# Patient Record
Sex: MALE | Race: WHITE | Employment: FULL TIME | URBAN - METROPOLITAN AREA
[De-identification: names, ages, dates, MRNs, and addresses within clinical notes are randomized per-mention and may not be internally consistent; named-entity substitution may affect disease eponyms.]

---

## 2017-10-02 DIAGNOSIS — E78.00 PURE HYPERCHOLESTEROLEMIA: ICD-10-CM

## 2017-10-02 DIAGNOSIS — I25.10 CORONARY ARTERY DISEASE INVOLVING NATIVE CORONARY ARTERY OF NATIVE HEART WITHOUT ANGINA PECTORIS: ICD-10-CM

## 2017-10-02 RX ORDER — ATORVASTATIN CALCIUM 80 MG/1
TABLET, FILM COATED ORAL
Qty: 90 TABLET | Refills: 3 | Status: SHIPPED | OUTPATIENT
Start: 2017-10-02 | End: 2018-10-08 | Stop reason: SDUPTHER

## 2017-10-05 ENCOUNTER — OFFICE VISIT (OUTPATIENT)
Dept: CARDIOLOGY CLINIC | Age: 52
End: 2017-10-05

## 2017-10-05 VITALS
DIASTOLIC BLOOD PRESSURE: 78 MMHG | HEART RATE: 66 BPM | BODY MASS INDEX: 35.77 KG/M2 | WEIGHT: 236 LBS | HEIGHT: 68 IN | SYSTOLIC BLOOD PRESSURE: 112 MMHG

## 2017-10-05 DIAGNOSIS — I25.10 CORONARY ARTERY DISEASE INVOLVING NATIVE CORONARY ARTERY OF NATIVE HEART WITHOUT ANGINA PECTORIS: Primary | ICD-10-CM

## 2017-10-05 DIAGNOSIS — I10 ESSENTIAL HYPERTENSION: ICD-10-CM

## 2017-10-05 DIAGNOSIS — E78.00 PURE HYPERCHOLESTEROLEMIA: ICD-10-CM

## 2017-10-05 PROCEDURE — 93000 ELECTROCARDIOGRAM COMPLETE: CPT | Performed by: INTERNAL MEDICINE

## 2017-10-05 PROCEDURE — 99214 OFFICE O/P EST MOD 30 MIN: CPT | Performed by: INTERNAL MEDICINE

## 2017-10-05 NOTE — MR AVS SNAPSHOT
percent of your current weight) by decreasing your calorie intake and becoming more physically active will help lower your risk of developing or worsening diseases associated with obesity. Learn more at: [employer]             Medications and Orders      Your Current Medications Are              atorvastatin (LIPITOR) 80 MG tablet One tablet by mouth once daily. traMADol (ULTRAM) 50 MG tablet Take 50 mg by mouth every 6 hours as needed for Pain    ALPRAZolam (XANAX) 0.5 MG tablet Take 0.5 mg by mouth nightly as needed for Sleep    lisinopril (PRINIVIL;ZESTRIL) 10 MG tablet Take 10 mg by mouth daily. metoprolol (LOPRESSOR) 25 MG tablet Take 25 mg by mouth 2 times daily. aspirin 325 MG tablet Take 325 mg by mouth daily. multivitamin-iron-minerals-folic acid (CENTRUM) chewable tablet Take 1 tablet by mouth daily. Allergies           No Known Allergies      We Ordered/Performed the following           EKG 12 lead unit performed     Comments: This order was created through External Result Entry    EKG 12 Lead          Result Summary for EKG 12 lead unit performed      Result Information     Status          Final result (Resulted: 10/5/2017)           10/5/2017  8:33 AM      Scans on Order [id number]            ECG on 10/5/2017  8:33 AM : ECG Report                     Additional Information        Basic Information     Date Of Birth Sex Race Ethnicity Preferred Language    1965 Male White Non-/Non  English      Problem List as of 10/5/2017  Date Reviewed: 10/5/2017                CAD (coronary artery disease)    Hypertension    Hyperlipidemia      Preventive Care        Date Due    Hepatitis C screening is recommended for all adults regardless of risk factors born between [facility] at least once (lifetime) who have never been tested.  1965    HIV screening is recommended for all people regardless of risk factors aged 15-65 years at least once (lifetime) who have never been HIV tested. 5/30/1979    Tetanus Combination Vaccine (1 - Tdap) 5/30/1983    Cholesterol Screening 5/30/2004    Colonoscopy 5/30/2014    Yearly Flu Vaccine (1) 9/1/2017            MyChart Signup           [employer] allows you to send messages to your doctor, view your test results, renew your prescriptions, schedule appointments, view visit notes, and more. How Do I Sign Up? 1. In your Internet browser, go to [email or web address]  2. Click on the Sign Up Now link in the Sign In box. You will see the New Member Sign Up page. 3. Enter your [employer] Access Code exactly as it appears below. You will not need to use this code after youve completed the sign-up process. If you do not sign up before the expiration date, you must request a new code. [employer] Access Code: [email or web address]  Expires: 12/4/2017  8:50 AM    4. Enter your Social Security Number ([id number]) and Date of Birth (mm/dd/yyyy) as indicated and click Submit. You will be taken to the next sign-up page. 5. Create a [employer] ID. This will be your [employer] login ID and cannot be changed, so think of one that is secure and easy to remember. 6. Create a [employer] password. You can change your password at any time. 7. Enter your Password Reset Question and Answer. This can be used at a later time if you forget your password. 8. Enter your e-mail address. You will receive e-mail notification when new information is available in [address] 9. Click Sign Up. You can now view your medical record. Additional Information  If you have questions, please contact the physician practice where you receive care. Remember, [employer] is NOT to be used for urgent needs. For medical emergencies, dial 911. For questions regarding your [employer] account call [phone]. If you have a clinical question, please call your doctor's office.

## 2017-10-05 NOTE — LETTER
[address] Cardiology [facility]  [address] [place] [address]  Phone: [phone]  Fax: [phone]    Carolyn Castillo MD        October 25, 2017     Paradise Arias  [address] [facility] [address]    Patient: Ruth Travis  MR Number: [id number]  YOB: 1965  Date of Visit: 10/5/2017    Dear Dr. Paradies Arias:    Thank you for the request for consultation for Cherylene Rhyme to me for the evaluation of Mr Vishal Mata. Below are the relevant portions of my assessment and plan of care. [address]   Cardiac Evaluation      Patient: Ruth Travis  YOB: 1965  Date: 10/5/17       Chief Complaint   Patient presents with    Coronary Artery Disease    Hyperlipidemia    Hypertension        Referring provider: Paradise Arias    History of Present Illness:   Mr Vishal Mata comes to the office today for follow up of his CAD. His cardiac history includes coronary disease with CABG April, 2008. At that time LIMA was placed to LAD and SVG to PDA with second SVG to 2 diagonals and OM. He is also treated for hypertension and hyperlipidemia. Mr Vishal Mata quit smoking in 2008. Today, Mr Vishal Mata states he has been well. He denies any chest pain, CANNON, palpitations, dizziness, or edema. He walks his dog for exercise. His job is very sedentary; works as . Past Medical History:   has a past medical history of CAD (coronary artery disease); Hyperlipidemia; and Hypertension. Surgical History:   has a past surgical history that includes Coronary artery bypass graft. Social History:  Social History     Social History    Marital status:      Spouse name: N/A    Number of children: N/A    Years of education: N/A     Occupational History    Not on file.      Social History Main Topics    Smoking status: Former Smoker     Packs/day: 2.00     Quit date: 4/1/2008    Smokeless tobacco: Not on file    Alcohol use Yes Comment: beer daily     Drug use: Not on file    Sexual activity: Not on file     Other Topics Concern    Not on file     Social History Narrative       Family History:  family history is not on file. Allergies:  Review of patient's allergies indicates no known allergies. Review of Systems:   · Constitutional: there has been no unanticipated weight loss. No change in energy or activity level   · Eyes: No visual changes   · ENT: No Headaches, hearing loss or vertigo. No mouth sores or sore throat. · Cardiovascular: Reviewed in HPI  · Respiratory: No cough or wheezing, no sputum production. · Gastrointestinal: No abdominal pain, appetite loss, blood in stools. No change in bowel or bladder habits. · Genitourinary: No nocturia, dysuria, trouble voiding  · Musculoskeletal:  No gait disturbance, weakness or joint complaints. · Integumentary: No rash or pruritis. · Neurological: No headache, change in muscle strength, numbness or tingling. No change in gait, balance, coordination, mood, affect, memory, mentation, behavior. · Psychiatric: No anxiety or depression  · Endocrine: No malaise or fever  · Hematologic/Lymphatic: No abnormal bruising or bleeding, blood clots or swollen lymph nodes. · Allergic/Immunologic: No nasal congestion or hives. Physical Examination:    Vitals:    10/05/17 0829   BP: 112/78   Site: Left Arm   Position: Sitting   Cuff Size: Medium Adult   Pulse: 66   Weight: 236 lb (107 kg)   Height: 5' 8\" (1.727 m)     Body mass index is 35.88 kg/(m^2).      Wt Readings from Last 3 Encounters:   10/05/17 236 lb (107 kg)   09/29/16 244 lb (110.7 kg)   07/06/15 246 lb (111.6 kg)      BP Readings from Last 3 Encounters:   10/05/17 112/78   09/29/16 138/80   07/06/15 128/70      Constitutional and General Appearance:  appears stated age  Respiratory:  · Normal excursion and expansion without use of accessory muscles  · Resp Auscultation: Normal breath sounds without dullness

## 2017-10-05 NOTE — PROGRESS NOTES
[address]   Cardiac Evaluation      Patient: Jackie Hector  YOB: 1965  Date: 10/5/17       Chief Complaint   Patient presents with    Coronary Artery Disease    Hyperlipidemia    Hypertension        Referring provider: Arturo Walker    History of Present Illness:   Mr Reginaldo Bell comes to the office today for follow up of his CAD. His cardiac history includes coronary disease with CABG April, 2008. At that time LIMA was placed to LAD and SVG to PDA with second SVG to 2 diagonals and OM. He is also treated for hypertension and hyperlipidemia. Mr Reginaldo Bell quit smoking in 2008. Today, Mr Reginaldo Bell states he has been well. He denies any chest pain, CANNON, palpitations, dizziness, or edema. He walks his dog for exercise. His job is very sedentary; works as . Past Medical History:   has a past medical history of CAD (coronary artery disease); Hyperlipidemia; and Hypertension. Surgical History:   has a past surgical history that includes Coronary artery bypass graft. Social History:  Social History     Social History    Marital status:      Spouse name: N/A    Number of children: N/A    Years of education: N/A     Occupational History    Not on file. Social History Main Topics    Smoking status: Former Smoker     Packs/day: 2.00     Quit date: 4/1/2008    Smokeless tobacco: Not on file    Alcohol use Yes      Comment: beer daily     Drug use: Not on file    Sexual activity: Not on file     Other Topics Concern    Not on file     Social History Narrative       Family History:  family history is not on file. Allergies:  Review of patient's allergies indicates no known allergies. Review of Systems:   · Constitutional: there has been no unanticipated weight loss. No change in energy or activity level   · Eyes: No visual changes   · ENT: No Headaches, hearing loss or vertigo. No mouth sores or sore throat.   · Cardiovascular: Reviewed in HPI  · Respiratory: No cough or wheezing, no sputum production. · Gastrointestinal: No abdominal pain, appetite loss, blood in stools. No change in bowel or bladder habits. · Genitourinary: No nocturia, dysuria, trouble voiding  · Musculoskeletal:  No gait disturbance, weakness or joint complaints. · Integumentary: No rash or pruritis. · Neurological: No headache, change in muscle strength, numbness or tingling. No change in gait, balance, coordination, mood, affect, memory, mentation, behavior. · Psychiatric: No anxiety or depression  · Endocrine: No malaise or fever  · Hematologic/Lymphatic: No abnormal bruising or bleeding, blood clots or swollen lymph nodes. · Allergic/Immunologic: No nasal congestion or hives. Physical Examination:    Vitals:    10/05/17 0829   BP: 112/78   Site: Left Arm   Position: Sitting   Cuff Size: Medium Adult   Pulse: 66   Weight: 236 lb (107 kg)   Height: 5' 8\" (1.727 m)     Body mass index is 35.88 kg/(m^2).      Wt Readings from Last 3 Encounters:   10/05/17 236 lb (107 kg)   09/29/16 244 lb (110.7 kg)   07/06/15 246 lb (111.6 kg)      BP Readings from Last 3 Encounters:   10/05/17 112/78   09/29/16 138/80   07/06/15 128/70      Constitutional and General Appearance:  appears stated age  Respiratory:  · Normal excursion and expansion without use of accessory muscles  · Resp Auscultation: Normal breath sounds without dullness  Cardiovascular:  · The apical impulses not displaced  · Heart is regular rate and rhythm with normal S1, S2  · The carotid upstroke is normal, no bruit noted   · JVP is not elevated  · Peripheral pulses are symmetrical  · There is no clubbing, cyanosis of the extremities  · No edema  · Femoral Arteries: 2+ and equal  · Pedal Pulses: 2+ and equal   Abdomen:  · No masses or tenderness  · Normal bowel sounds  Neurological/Psychiatric:  · Alert and oriented x3  · Moves all extremities well  · Exhibits normal gait balance and coordination      Assessment/Plan  1. CAD (coronary artery disease) -stable, EKG>sinus rhythm  Stress echo 11/12: EF 60%, mild cLVH, mild MR and TR, walked 7 minutes 55 seconds, no ischemia  Angiogram 2001: angioplasty of totally occluded RCA  CABG 4/08: LIMA-LAD, SVG-PDA, SVG-2 diagonals and OM branch   2. Hypertension -stable   3. Hyperlipidemia --stable, labs 7/30/16: ; TRIG 150; HDL 45; , having labs next week w/ pcp       Stable cardiac status. No med changes. Encouraged to get regular exercise. FU 1 year. Thank you for allowing to me to participate in the care of Dorinda Freedman.

## 2017-10-25 NOTE — COMMUNICATION BODY
[address]   Cardiac Evaluation      Patient: Tommie Nowak  YOB: 1965  Date: 10/5/17       Chief Complaint   Patient presents with    Coronary Artery Disease    Hyperlipidemia    Hypertension        Referring provider: Mike Wood    History of Present Illness:   Mr Dov Dill comes to the office today for follow up of his CAD. His cardiac history includes coronary disease with CABG April, 2008. At that time LIMA was placed to LAD and SVG to PDA with second SVG to 2 diagonals and OM. He is also treated for hypertension and hyperlipidemia. Mr Dov Dill quit smoking in 2008. Today, Mr Dov Dill states he has been well. He denies any chest pain, CANNON, palpitations, dizziness, or edema. He walks his dog for exercise. His job is very sedentary; works as . Past Medical History:   has a past medical history of CAD (coronary artery disease); Hyperlipidemia; and Hypertension. Surgical History:   has a past surgical history that includes Coronary artery bypass graft. Social History:  Social History     Social History    Marital status:      Spouse name: N/A    Number of children: N/A    Years of education: N/A     Occupational History    Not on file. Social History Main Topics    Smoking status: Former Smoker     Packs/day: 2.00     Quit date: 4/1/2008    Smokeless tobacco: Not on file    Alcohol use Yes      Comment: beer daily     Drug use: Not on file    Sexual activity: Not on file     Other Topics Concern    Not on file     Social History Narrative       Family History:  family history is not on file. Allergies:  Review of patient's allergies indicates no known allergies. Review of Systems:   · Constitutional: there has been no unanticipated weight loss. No change in energy or activity level   · Eyes: No visual changes   · ENT: No Headaches, hearing loss or vertigo. No mouth sores or sore throat.   · Cardiovascular: Reviewed in

## 2018-10-08 ENCOUNTER — TELEPHONE (OUTPATIENT)
Dept: CARDIOLOGY CLINIC | Age: 53
End: 2018-10-08

## 2018-10-08 RX ORDER — ATORVASTATIN CALCIUM 80 MG/1
TABLET, FILM COATED ORAL
Qty: 30 TABLET | Refills: 0 | Status: SHIPPED | OUTPATIENT
Start: 2018-10-08 | End: 2018-10-10 | Stop reason: SDUPTHER
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
## 2018-10-10 ENCOUNTER — OFFICE VISIT (OUTPATIENT)
Dept: CARDIOLOGY CLINIC | Age: 53
End: 2018-10-10
Payer: COMMERCIAL

## 2018-10-10 VITALS
DIASTOLIC BLOOD PRESSURE: 60 MMHG | BODY MASS INDEX: 36.22 KG/M2 | WEIGHT: 239 LBS | SYSTOLIC BLOOD PRESSURE: 120 MMHG | HEIGHT: 68 IN | HEART RATE: 58 BPM

## 2018-10-10 DIAGNOSIS — I10 ESSENTIAL HYPERTENSION: ICD-10-CM

## 2018-10-10 DIAGNOSIS — I25.10 CORONARY ARTERY DISEASE INVOLVING NATIVE CORONARY ARTERY OF NATIVE HEART WITHOUT ANGINA PECTORIS: ICD-10-CM

## 2018-10-10 DIAGNOSIS — E78.2 MIXED HYPERLIPIDEMIA: Primary | ICD-10-CM

## 2018-10-10 PROCEDURE — 99214 OFFICE O/P EST MOD 30 MIN: CPT | Performed by: NURSE PRACTITIONER

## 2018-10-10 RX ORDER — ATORVASTATIN CALCIUM 80 MG/1
TABLET, FILM COATED ORAL
Qty: 90 TABLET | Refills: 3 | Status: SHIPPED | OUTPATIENT
Start: 2018-10-10

## 2018-10-10 NOTE — LETTER
[address] [place] [address] Cardiology [facility]  [address] [place] [address]  Phone: [phone]  Fax: [phone] Karenon KAREL Guo - CNP        October 11, 2018     [address] [address]    Patient: Citlali Can  MR Number: [id number]  YOB: 1965  Date of Visit: 10/10/2018    Dear Dr. Barnett Brochure:        [address]   Cardiac Evaluation      Patient: Citlali Can  YOB: 1965  Date: 10/10/18       Chief Complaint   Patient presents with    Coronary Artery Disease    Hyperlipidemia    Hypertension        Referring provider: Ericka Reno    History of Present Illness:   Mr Yris Eric comes to the office today for follow up of his CAD. His cardiac history includes coronary disease with CABG April, 2008. At that time LIMA was placed to LAD and SVG to PDA with second SVG to 2 diagonals and OM. He is also treated for hypertension and hyperlipidemia. Mr Yris Eric quit smoking in 2008. Today, Mr Yris Eric states he has been well. He denies any chest pain, CANNON, palpitations, dizziness, or edema. He walks his dog for exercise. He works as . He has not needed any NTG    Past Medical History:   has a past medical history of CAD (coronary artery disease); Hyperlipidemia; and Hypertension. Surgical History:   has a past surgical history that includes Coronary artery bypass graft. Social History:  Social History     Social History    Marital status:      Spouse name: N/A    Number of children: N/A    Years of education: N/A     Occupational History    Not on file.      Social History Main Topics    Smoking status: Former Smoker     Packs/day: 2.00     Quit date: 4/1/2008    Smokeless tobacco: Former User    Alcohol use Yes      Comment: beer daily     Drug use: Unknown    Sexual activity: Not on file     Other Topics Concern    Not on file

## 2018-10-10 NOTE — PROGRESS NOTES
HPI  · Respiratory: No cough or wheezing, no sputum production. · Gastrointestinal: No abdominal pain, appetite loss, blood in stools. No change in bowel or bladder habits. · Genitourinary: No nocturia, dysuria, trouble voiding  · Musculoskeletal:  No gait disturbance, weakness or joint complaints. · Integumentary: No rash or pruritis. · Neurological: No headache, change in muscle strength, numbness or tingling. No change in gait, balance, coordination, mood, affect, memory, mentation, behavior. · Psychiatric: No anxiety or depression  · Endocrine: No malaise or fever  · Hematologic/Lymphatic: No abnormal bruising or bleeding, blood clots or swollen lymph nodes. · Allergic/Immunologic: No nasal congestion or hives. Physical Examination:    Vitals:    10/10/18 1011   BP: 120/60   Site: Left Upper Arm   Position: Sitting   Cuff Size: Medium Adult   Pulse: 58   Weight: 239 lb (108.4 kg)   Height: 5' 8\" (1.727 m)     Body mass index is 36.34 kg/m².      Wt Readings from Last 3 Encounters:   10/10/18 239 lb (108.4 kg)   10/05/17 236 lb (107 kg)   09/29/16 244 lb (110.7 kg)      BP Readings from Last 3 Encounters:   10/10/18 120/60   10/05/17 112/78   09/29/16 138/80      Constitutional and General Appearance:  appears stated age  Respiratory:  · Normal excursion and expansion without use of accessory muscles  · Resp Auscultation: Normal breath sounds without dullness  Cardiovascular:  · The apical impulses not displaced  · Heart is regular rate and rhythm with normal S1, S2  · The carotid upstroke is normal, no bruit noted   · JVP is not elevated  · Peripheral pulses are symmetrical  · There is no clubbing, cyanosis of the extremities  · No edema  · Femoral Arteries: 2+ and equal  · Pedal Pulses: 2+ and equal   Abdomen:  · No masses or tenderness  · Normal bowel sounds  Neurological/Psychiatric:  · Alert and oriented x3  · Moves all extremities well  · Exhibits normal gait balance and coordination      Assessment/Plan  1. CAD (coronary artery disease) -stable  Stress echo 11/12: EF 60%, mild cLVH, mild MR and TR, walked 7 minutes 55 seconds, no ischemia  Angiogram 2001: angioplasty of totally occluded RCA  CABG 4/08: LIMA-LAD, SVG-PDA, SVG-2 diagonals and OM branch   2. Hypertension -stable   3. Hyperlipidemia --stable, labs 7/30/16: ; TRIG 150; HDL 45; , having labs next week w/ pcp, getting labs done in few weeks  Lipitor increase to 80 mg at last OV       Stable cardiac status. No med changes. Encouraged to get regular exercise. FU 1 year. Thank you for allowing to me to participate in the care of Preeti Leyva.

## 2018-10-11 NOTE — COMMUNICATION BODY
coordination      Assessment/Plan  1. CAD (coronary artery disease) -stable  Stress echo 11/12: EF 60%, mild cLVH, mild MR and TR, walked 7 minutes 55 seconds, no ischemia  Angiogram 2001: angioplasty of totally occluded RCA  CABG 4/08: LIMA-LAD, SVG-PDA, SVG-2 diagonals and OM branch   2. Hypertension -stable   3. Hyperlipidemia --stable, labs 7/30/16: ; TRIG 150; HDL 45; , having labs next week w/ pcp, getting labs done in few weeks  Lipitor increase to 80 mg at last OV       Stable cardiac status. No med changes. Encouraged to get regular exercise. FU 1 year. Thank you for allowing to me to participate in the care of Abdirahman Freitas.

## 2018-10-23 ENCOUNTER — APPOINTMENT (OUTPATIENT)
Dept: CT IMAGING | Age: 53
End: 2018-10-23
Payer: COMMERCIAL

## 2018-10-23 ENCOUNTER — APPOINTMENT (OUTPATIENT)
Dept: GENERAL RADIOLOGY | Age: 53
End: 2018-10-23
Payer: COMMERCIAL

## 2018-10-23 ENCOUNTER — HOSPITAL ENCOUNTER (OUTPATIENT)
Age: 53
Setting detail: OBSERVATION
Discharge: HOME | End: 2018-10-24
Attending: EMERGENCY MEDICINE | Admitting: INTERNAL MEDICINE
Payer: COMMERCIAL

## 2018-10-23 DIAGNOSIS — R07.9 ACUTE CHEST PAIN: Primary | ICD-10-CM

## 2018-10-23 DIAGNOSIS — K55.1 SMA STENOSIS: ICD-10-CM

## 2018-10-23 LAB
A/G RATIO: 1.6 (ref 1.1–2.2)
ALBUMIN SERPL-MCNC: 4.4 G/DL (ref 3.4–5)
ALP BLD-CCNC: 59 U/L (ref 40–129)
ALT SERPL-CCNC: 73 U/L (ref 10–40)
ANION GAP SERPL CALCULATED.3IONS-SCNC: 15 MMOL/L (ref 3–16)
APTT: 28.3 SEC (ref 26–36)
AST SERPL-CCNC: 50 U/L (ref 15–37)
BASOPHILS ABSOLUTE: 0.1 K/UL (ref 0–0.2)
BASOPHILS RELATIVE PERCENT: 1 %
BILIRUB SERPL-MCNC: 0.6 MG/DL (ref 0–1)
BILIRUBIN URINE: NEGATIVE
BLOOD, URINE: NEGATIVE
BUN BLDV-MCNC: 15 MG/DL (ref 7–20)
CALCIUM SERPL-MCNC: 8.9 MG/DL (ref 8.3–10.6)
CHLORIDE BLD-SCNC: 99 MMOL/L (ref 99–110)
CLARITY: CLEAR
CO2: 23 MMOL/L (ref 21–32)
COLOR: YELLOW
CREAT SERPL-MCNC: 0.8 MG/DL (ref 0.9–1.3)
EOSINOPHILS ABSOLUTE: 0.2 K/UL (ref 0–0.6)
EOSINOPHILS RELATIVE PERCENT: 3.3 %
GFR AFRICAN AMERICAN: >60
GFR NON-AFRICAN AMERICAN: >60
GLOBULIN: 2.7 G/DL
GLUCOSE BLD-MCNC: 134 MG/DL (ref 70–99)
GLUCOSE URINE: NEGATIVE MG/DL
HCT VFR BLD CALC: 42.9 % (ref 40.5–52.5)
HEMOGLOBIN: 14.4 G/DL (ref 13.5–17.5)
INR BLD: 1.02 (ref 0.86–1.14)
KETONES, URINE: NEGATIVE MG/DL
LEUKOCYTE ESTERASE, URINE: NEGATIVE
LIPASE: 30 U/L (ref 13–60)
LYMPHOCYTES ABSOLUTE: 1.7 K/UL (ref 1–5.1)
LYMPHOCYTES RELATIVE PERCENT: 23.1 %
MCH RBC QN AUTO: 32.5 PG (ref 26–34)
MCHC RBC AUTO-ENTMCNC: 33.6 G/DL (ref 31–36)
MCV RBC AUTO: 96.7 FL (ref 80–100)
MICROSCOPIC EXAMINATION: NORMAL
MONOCYTES ABSOLUTE: 0.8 K/UL (ref 0–1.3)
MONOCYTES RELATIVE PERCENT: 10.7 %
NEUTROPHILS ABSOLUTE: 4.4 K/UL (ref 1.7–7.7)
NEUTROPHILS RELATIVE PERCENT: 61.9 %
NITRITE, URINE: NEGATIVE
PDW BLD-RTO: 13.2 % (ref 12.4–15.4)
PH UA: 6.5
PLATELET # BLD: 110 K/UL (ref 135–450)
PMV BLD AUTO: 10.3 FL (ref 5–10.5)
POTASSIUM SERPL-SCNC: 4 MMOL/L (ref 3.5–5.1)
PRO-BNP: 150 PG/ML (ref 0–124)
PROTEIN UA: NEGATIVE MG/DL
PROTHROMBIN TIME: 11.6 SEC (ref 9.8–13)
RBC # BLD: 4.44 M/UL (ref 4.2–5.9)
SODIUM BLD-SCNC: 137 MMOL/L (ref 136–145)
SPECIFIC GRAVITY UA: 1.03
TOTAL PROTEIN: 7.1 G/DL (ref 6.4–8.2)
TROPONIN: <0.01 NG/ML
URINE REFLEX TO CULTURE: NORMAL
URINE TYPE: NORMAL
UROBILINOGEN, URINE: 0.2 E.U./DL
WBC # BLD: 7.2 K/UL (ref 4–11)

## 2018-10-23 PROCEDURE — 96372 THER/PROPH/DIAG INJ SC/IM: CPT

## 2018-10-23 PROCEDURE — 99285 EMERGENCY DEPT VISIT HI MDM: CPT

## 2018-10-23 PROCEDURE — G0378 HOSPITAL OBSERVATION PER HR: HCPCS

## 2018-10-23 PROCEDURE — 93005 ELECTROCARDIOGRAM TRACING: CPT | Performed by: EMERGENCY MEDICINE

## 2018-10-23 PROCEDURE — 81003 URINALYSIS AUTO W/O SCOPE: CPT

## 2018-10-23 PROCEDURE — 80053 COMPREHEN METABOLIC PANEL: CPT

## 2018-10-23 PROCEDURE — 71046 X-RAY EXAM CHEST 2 VIEWS: CPT

## 2018-10-23 PROCEDURE — 6370000000 HC RX 637 (ALT 250 FOR IP): Performed by: INTERNAL MEDICINE

## 2018-10-23 PROCEDURE — 99245 OFF/OP CONSLTJ NEW/EST HI 55: CPT | Performed by: INTERNAL MEDICINE

## 2018-10-23 PROCEDURE — 71275 CT ANGIOGRAPHY CHEST: CPT

## 2018-10-23 PROCEDURE — 6360000002 HC RX W HCPCS: Performed by: INTERNAL MEDICINE

## 2018-10-23 PROCEDURE — 83880 ASSAY OF NATRIURETIC PEPTIDE: CPT

## 2018-10-23 PROCEDURE — 83690 ASSAY OF LIPASE: CPT

## 2018-10-23 PROCEDURE — 74174 CTA ABD&PLVS W/CONTRAST: CPT

## 2018-10-23 PROCEDURE — 6360000004 HC RX CONTRAST MEDICATION: Performed by: EMERGENCY MEDICINE

## 2018-10-23 PROCEDURE — 93010 ELECTROCARDIOGRAM REPORT: CPT | Performed by: INTERNAL MEDICINE

## 2018-10-23 PROCEDURE — 85025 COMPLETE CBC W/AUTO DIFF WBC: CPT

## 2018-10-23 PROCEDURE — 6370000000 HC RX 637 (ALT 250 FOR IP): Performed by: PHYSICIAN ASSISTANT

## 2018-10-23 PROCEDURE — 84484 ASSAY OF TROPONIN QUANT: CPT

## 2018-10-23 PROCEDURE — 2580000003 HC RX 258: Performed by: INTERNAL MEDICINE

## 2018-10-23 PROCEDURE — 36415 COLL VENOUS BLD VENIPUNCTURE: CPT

## 2018-10-23 PROCEDURE — 85730 THROMBOPLASTIN TIME PARTIAL: CPT

## 2018-10-23 PROCEDURE — 85610 PROTHROMBIN TIME: CPT

## 2018-10-23 RX ORDER — NITROGLYCERIN 0.4 MG/1
0.4 TABLET SUBLINGUAL EVERY 5 MIN PRN
Status: DISCONTINUED | OUTPATIENT
Start: 2018-10-23 | End: 2018-10-24 | Stop reason: HOSPADM

## 2018-10-23 RX ORDER — ASPIRIN 81 MG/1
81 TABLET, CHEWABLE ORAL ONCE
Status: DISCONTINUED | OUTPATIENT
Start: 2018-10-24 | End: 2018-10-24

## 2018-10-23 RX ORDER — M-VIT,TX,IRON,MINS/CALC/FOLIC 27MG-0.4MG
1 TABLET ORAL DAILY
Status: DISCONTINUED | OUTPATIENT
Start: 2018-10-23 | End: 2018-10-24 | Stop reason: HOSPADM

## 2018-10-23 RX ORDER — LISINOPRIL 10 MG/1
10 TABLET ORAL DAILY
Status: DISCONTINUED | OUTPATIENT
Start: 2018-10-24 | End: 2018-10-24 | Stop reason: HOSPADM

## 2018-10-23 RX ORDER — SODIUM CHLORIDE 0.9 % (FLUSH) 0.9 %
10 SYRINGE (ML) INJECTION EVERY 12 HOURS SCHEDULED
Status: DISCONTINUED | OUTPATIENT
Start: 2018-10-23 | End: 2018-10-24 | Stop reason: HOSPADM

## 2018-10-23 RX ORDER — ONDANSETRON 2 MG/ML
4 INJECTION INTRAMUSCULAR; INTRAVENOUS EVERY 6 HOURS PRN
Status: DISCONTINUED | OUTPATIENT
Start: 2018-10-23 | End: 2018-10-24 | Stop reason: HOSPADM

## 2018-10-23 RX ORDER — ASPIRIN 325 MG
325 TABLET ORAL DAILY
Status: DISCONTINUED | OUTPATIENT
Start: 2018-10-24 | End: 2018-10-24 | Stop reason: HOSPADM

## 2018-10-23 RX ORDER — ALPRAZOLAM 0.5 MG/1
0.5 TABLET ORAL NIGHTLY PRN
Status: DISCONTINUED | OUTPATIENT
Start: 2018-10-23 | End: 2018-10-24 | Stop reason: HOSPADM

## 2018-10-23 RX ORDER — SODIUM CHLORIDE 0.9 % (FLUSH) 0.9 %
10 SYRINGE (ML) INJECTION PRN
Status: DISCONTINUED | OUTPATIENT
Start: 2018-10-23 | End: 2018-10-24 | Stop reason: HOSPADM

## 2018-10-23 RX ORDER — ATORVASTATIN CALCIUM 80 MG/1
80 TABLET, FILM COATED ORAL NIGHTLY
Status: DISCONTINUED | OUTPATIENT
Start: 2018-10-23 | End: 2018-10-24 | Stop reason: HOSPADM

## 2018-10-23 RX ADMIN — Medication 10 ML: at 21:21

## 2018-10-23 RX ADMIN — NITROGLYCERIN 1 INCH: 20 OINTMENT TOPICAL at 11:30

## 2018-10-23 RX ADMIN — ALPRAZOLAM 0.5 MG: 0.5 TABLET ORAL at 21:19

## 2018-10-23 RX ADMIN — ATORVASTATIN CALCIUM 80 MG: 80 TABLET, FILM COATED ORAL at 21:26

## 2018-10-23 RX ADMIN — IOPAMIDOL 85 ML: 755 INJECTION, SOLUTION INTRAVENOUS at 09:17

## 2018-10-23 RX ADMIN — ENOXAPARIN SODIUM 40 MG: 40 INJECTION SUBCUTANEOUS at 21:20

## 2018-10-23 RX ADMIN — METOPROLOL TARTRATE 25 MG: 25 TABLET ORAL at 21:19

## 2018-10-23 ASSESSMENT — PAIN DESCRIPTION - PAIN TYPE
TYPE: ACUTE PAIN
TYPE: ACUTE PAIN

## 2018-10-23 ASSESSMENT — PAIN SCALES - GENERAL
PAINLEVEL_OUTOF10: 5
PAINLEVEL_OUTOF10: 0

## 2018-10-23 ASSESSMENT — ENCOUNTER SYMPTOMS
NAUSEA: 0
COUGH: 0
DIARRHEA: 0
CONSTIPATION: 0
BACK PAIN: 1
COLOR CHANGE: 0
WHEEZING: 0
ABDOMINAL DISTENTION: 0
STRIDOR: 0
VOMITING: 0
ABDOMINAL PAIN: 1
SHORTNESS OF BREATH: 0

## 2018-10-23 ASSESSMENT — PAIN DESCRIPTION - LOCATION
LOCATION: CHEST
LOCATION: CHEST

## 2018-10-23 ASSESSMENT — PAIN DESCRIPTION - PROGRESSION: CLINICAL_PROGRESSION: RESOLVED

## 2018-10-23 NOTE — ED PROVIDER NOTES
EKG's are interpreted by the Emergency Department Physician who either signs orCo-signs this chart in the absence of a cardiologist.  Please see their note for interpretation of EKG. RADIOLOGY:   Non-plain film images such as CT, Ultrasound and MRI are read by the radiologist. Plain radiographic images are visualized andpreliminarily interpreted by the  ED Provider with the below findings:        Interpretation perthe Radiologist below, if available at the time of this note:    CTA ABDOMEN PELVIS W CONTRAST   Preliminary Result   1. Unremarkable CTA appearance of the aorta, without evidence of aneurysm or   dissection. 2. No CT evidence of a pulmonary embolism. 3. No acute intrapulmonary findings. 4. No acute intra-abdominal or intrapelvic process. 5. Mild atherosclerotic disease of the SMA causing a mild approximate 50%   stenosis proximally. CTA CHEST W CONTRAST   Preliminary Result   1. Unremarkable CTA appearance of the aorta, without evidence of aneurysm or   dissection. 2. No CT evidence of a pulmonary embolism. 3. No acute intrapulmonary findings. 4. No acute intra-abdominal or intrapelvic process. 5. Mild atherosclerotic disease of the SMA causing a mild approximate 50%   stenosis proximally. XR CHEST STANDARD (2 VW)   Final Result   No evidence of acute cardiopulmonary disease.                  PROCEDURES   Unless otherwise noted below, none     Procedures    CRITICAL CARE TIME   N/A    CONSULTS:  Ramya Valenzuela will admit ([id number])    [address] [place] [address] and DIFFERENTIALDIAGNOSIS/MDM:   Vitals:    Vitals:    10/23/18 0729 10/23/18 1000 10/23/18 1015 10/23/18 1030   BP: (!) 142/84 136/84 137/79 135/76   Pulse: 62 56 57 57   Resp: 18      Temp: 97 °F (36.1 °C)      TempSrc: Oral      SpO2: 97% 96% 96% 96%       Patient was given thefollowing medications:  Medications   nitroglycerin (NITRO-BID) 2 % ointment 1 inch (not administered)

## 2018-10-23 NOTE — ED NOTES
Pt report given to KELSEY Sandoval, states no questions or concerns at this time. Pt transported to floor by floor RN via wheelchair with portable telemetry on throughout transport.       175 Roselia Corral RN  10/23/18 7919

## 2018-10-23 NOTE — H&P
Hyperlipidemia; and Hypertension. Past Surgical History:   has a past surgical history that includes Coronary artery bypass graft. Medications:  No current facility-administered medications on file prior to encounter. Current Outpatient Prescriptions on File Prior to Encounter   Medication Sig Dispense Refill    atorvastatin (LIPITOR) 80 MG tablet One tablet by mouth once daily. 90 tablet 3    ALPRAZolam (XANAX) 0.5 MG tablet Take 0.5 mg by mouth nightly as needed for Sleep      lisinopril (PRINIVIL;ZESTRIL) 10 MG tablet Take 10 mg by mouth daily.  metoprolol (LOPRESSOR) 25 MG tablet Take 25 mg by mouth 2 times daily.  aspirin 325 MG tablet Take 325 mg by mouth daily.  multivitamin-iron-minerals-folic acid (CENTRUM) chewable tablet Take 1 tablet by mouth daily. Allergies:  No Known Allergies     Social History:   reports that he quit smoking about 10 years ago. He smoked 2.00 packs per day. He has quit using smokeless tobacco. He reports that he drinks alcohol. Family History: he denies family history of chest pain     Physical Exam:  /62   Pulse 55   Temp 97 °F (36.1 °C) (Oral)   Resp 18   SpO2 95%     General appearance:  Appears comfortable. Well nourished  Eyes: Sclera clear, pupils equal  ENT: Moist mucus membranes, no thrush. Trachea midline. Cardiovascular: Regular rhythm, normal S1, S2. No murmur, gallop, rub. No edema in lower extremities  Respiratory: Clear to auscultation bilaterally, no wheeze, good inspiratory effort  Gastrointestinal: Abdomen soft, non-tender, not distended, normal bowel sounds  Musculoskeletal: No cyanosis in digits, neck supple  Neurology: Cranial nerves grossly intact. Alert and oriented in time, place and person. No speech or motor deficits  Psychiatry: Appropriate affect.  Not agitated  Skin: Warm, dry, normal turgor, no rash    Labs:  CBC:   Lab Results   Component Value Date    WBC 7.2 10/23/2018    RBC 4.44 10/23/2018

## 2018-10-24 VITALS
WEIGHT: 233.7 LBS | TEMPERATURE: 97.4 F | BODY MASS INDEX: 35.42 KG/M2 | OXYGEN SATURATION: 96 % | RESPIRATION RATE: 16 BRPM | DIASTOLIC BLOOD PRESSURE: 78 MMHG | HEART RATE: 58 BPM | HEIGHT: 68 IN | SYSTOLIC BLOOD PRESSURE: 126 MMHG

## 2018-10-24 LAB
LV EF: 61 %
LVEF MODALITY: NORMAL

## 2018-10-24 PROCEDURE — G0378 HOSPITAL OBSERVATION PER HR: HCPCS

## 2018-10-24 PROCEDURE — 3430000000 HC RX DIAGNOSTIC RADIOPHARMACEUTICAL: Performed by: INTERNAL MEDICINE

## 2018-10-24 PROCEDURE — A9502 TC99M TETROFOSMIN: HCPCS | Performed by: INTERNAL MEDICINE

## 2018-10-24 PROCEDURE — 78452 HT MUSCLE IMAGE SPECT MULT: CPT

## 2018-10-24 PROCEDURE — 99225 PR SBSQ OBSERVATION CARE/DAY 25 MINUTES: CPT | Performed by: INTERNAL MEDICINE

## 2018-10-24 PROCEDURE — 6370000000 HC RX 637 (ALT 250 FOR IP): Performed by: INTERNAL MEDICINE

## 2018-10-24 PROCEDURE — 93017 CV STRESS TEST TRACING ONLY: CPT | Performed by: INTERNAL MEDICINE

## 2018-10-24 PROCEDURE — 6360000002 HC RX W HCPCS: Performed by: INTERNAL MEDICINE

## 2018-10-24 PROCEDURE — 2580000003 HC RX 258: Performed by: INTERNAL MEDICINE

## 2018-10-24 RX ADMIN — LISINOPRIL 10 MG: 10 TABLET ORAL at 08:12

## 2018-10-24 RX ADMIN — Medication 10 ML: at 08:13

## 2018-10-24 RX ADMIN — REGADENOSON 0.4 MG: 0.08 INJECTION, SOLUTION INTRAVENOUS at 10:02

## 2018-10-24 RX ADMIN — ASPIRIN 325 MG: 325 TABLET ORAL at 08:12

## 2018-10-24 RX ADMIN — METOPROLOL TARTRATE 25 MG: 25 TABLET ORAL at 08:12

## 2018-10-24 RX ADMIN — TETROFOSMIN 10 MILLICURIE: 0.23 INJECTION, POWDER, LYOPHILIZED, FOR SOLUTION INTRAVENOUS at 08:25

## 2018-10-24 RX ADMIN — MULTIPLE VITAMINS W/ MINERALS TAB 1 TABLET: TAB at 08:12

## 2018-10-24 RX ADMIN — TETROFOSMIN 30 MILLICURIE: 0.23 INJECTION, POWDER, LYOPHILIZED, FOR SOLUTION INTRAVENOUS at 10:07

## 2018-10-24 ASSESSMENT — PAIN SCALES - GENERAL
PAINLEVEL_OUTOF10: 0

## 2018-10-24 NOTE — PLAN OF CARE
Problem: Pain:  Goal: Patient's pain/discomfort is manageable  Patient's pain/discomfort is manageable   Pt can rate pain on a 0-10 scale. Pt pain will remain at a level that is tolerable to pt. PRN pain medication as needed.

## 2018-10-24 NOTE — PROGRESS NOTES
Patient instructed on Eulogio Protocol Stress Test Procedure including possible side effects and adverse reactions. Verbalizes knowledge and understanding and denies having any questions. Mina Silvestre RN

## 2018-10-24 NOTE — PROGRESS NOTES
Pt awake in bed watching tv. VSS, assessment complete and medications given. Denies any other needs. Call light in reach and visitor at bedside.

## 2018-10-24 NOTE — PROGRESS NOTES
Discharge instructions, follow up appointments and medications reviewed with pt. Pt denies any needs or questions. IV removed intact with no complications and dressing applied. All pt belongings gathered and put into bags. Will call transport when ride arrives.

## 2018-10-24 NOTE — PROGRESS NOTES
Patient unable to reach target heart on treadmill with Eulogio Protocol and unable to go any further or any faster per Patient. See new order. Instructed on Lexiscan Stress Test Procedure including possible side effects/ adverse reactions. Patient verbalizes  understanding and denies having any questions . See [address] [place] [address] Cardiology

## 2018-10-24 NOTE — PROGRESS NOTES
[facility]   Progress Note  Cardiology    Chief Complaint   Patient presents with    Chest Pain     started this am, with chest pain , abd pain and back pain, arms tingling     HPI: Doing well today. GXT done this am which was changed to lexiscan due to inability to reach target heart rate. Fixed inferior defect consistent with known previous IMI from 2001. Medications/Labs all Reviewed    Recent Labs      10/23/18   0818   WBC  7.2   HGB  14.4   HCT  42.9   MCV  96.7   PLT  110*     Recent Labs      10/23/18   0818   CREATININE  0.8*   BUN  15   NA  137   K  4.0   CL  99   CO2  23     Recent Labs      10/23/18   0818   INR  1.02   PROTIME  11.6        MEDICATIONS:    sodium chloride flush  10 mL Intravenous 2 times per day    enoxaparin  40 mg Subcutaneous Nightly    aspirin  325 mg Oral Daily    atorvastatin  80 mg Oral Nightly    lisinopril  10 mg Oral Daily    metoprolol tartrate  25 mg Oral BID    therapeutic multivitamin-minerals  1 tablet Oral Daily    influenza virus vaccine  0.5 mL Intramuscular Once         Physical Examination:    BP (!) 136/3   Pulse 59   Temp 97.2 °F (36.2 °C) (Temporal)   Resp 16   Ht 5' 8\" (1.727 m)   Wt 233 lb 11.2 oz (106 kg)   SpO2 93%   BMI 35.53 kg/m²     Respiratory:  · Resp Assessment: Normal respiratory effort  · Resp Auscultation: Clear to auscultation bilaterally   Cardiovascular:  · Auscultation: regular rate and rhythm, normal S1S2, no murmur, rub or gallop  · Palpation:  Nl PMI  · JVP:  normal  · Extremities: No Edema  Abdomen:  · Soft, non-tender  · Normal bowel sounds  Extremities:  ·  No Cyanosis or Clubbing  Neurological/Psychiatric:  · Oriented to time, place, and person  · Non-anxious  Skin Warm and dry    Assessment:    Patient Active Hospital Problem List:   Chest pain (10/23/2018)    Assessment: atypical     CAD - sp PCI in 2001 and CABG in 2008 - fixed inferior defect    OK for discharge and regular f/u with me.

## 2018-10-25 LAB
EKG ATRIAL RATE: 61 BPM
EKG DIAGNOSIS: NORMAL
EKG P AXIS: 64 DEGREES
EKG P-R INTERVAL: 152 MS
EKG Q-T INTERVAL: 440 MS
EKG QRS DURATION: 104 MS
EKG QTC CALCULATION (BAZETT): 442 MS
EKG R AXIS: 0 DEGREES
EKG T AXIS: 40 DEGREES
EKG VENTRICULAR RATE: 61 BPM

## 2018-12-06 ENCOUNTER — OFFICE VISIT (OUTPATIENT)
Dept: CARDIOLOGY CLINIC | Age: 53
End: 2018-12-06
Payer: COMMERCIAL

## 2018-12-06 VITALS
WEIGHT: 241 LBS | OXYGEN SATURATION: 94 % | HEIGHT: 68 IN | BODY MASS INDEX: 36.53 KG/M2 | HEART RATE: 66 BPM | SYSTOLIC BLOOD PRESSURE: 126 MMHG | DIASTOLIC BLOOD PRESSURE: 64 MMHG

## 2018-12-06 DIAGNOSIS — I25.10 CORONARY ARTERY DISEASE INVOLVING NATIVE CORONARY ARTERY OF NATIVE HEART WITHOUT ANGINA PECTORIS: Primary | ICD-10-CM

## 2018-12-06 DIAGNOSIS — E78.00 PURE HYPERCHOLESTEROLEMIA: ICD-10-CM

## 2018-12-06 DIAGNOSIS — I10 ESSENTIAL HYPERTENSION: ICD-10-CM

## 2018-12-06 PROCEDURE — 99214 OFFICE O/P EST MOD 30 MIN: CPT | Performed by: INTERNAL MEDICINE

## 2018-12-06 RX ORDER — ESCITALOPRAM OXALATE 10 MG/1
10 TABLET ORAL
COMMUNITY
Start: 2018-12-05